# Patient Record
Sex: MALE | Employment: STUDENT | URBAN - METROPOLITAN AREA
[De-identification: names, ages, dates, MRNs, and addresses within clinical notes are randomized per-mention and may not be internally consistent; named-entity substitution may affect disease eponyms.]

---

## 2019-02-18 ENCOUNTER — TELEPHONE (OUTPATIENT)
Dept: INTERNAL MEDICINE | Facility: CLINIC | Age: 24
End: 2019-02-18

## 2019-02-18 NOTE — TELEPHONE ENCOUNTER
----- Message from Brian Upton sent at 2/15/2019  7:24 PM CST -----  Contact: Pt's Mother Ms.Carloyn Ray   Pt would like to be called back regarding needing to Est. Care with PCP. Looking to scheduling for a Physical appt. Mother would like to speak with nurse.    Pt can be reached at [phone].    Thank You.

## 2019-02-18 NOTE — TELEPHONE ENCOUNTER
----- Message from Brian Upton sent at 2/15/2019  7:24 PM CST -----  Contact: Pt's Mother Ms.Carloyn Ray   Pt would like to be called back regarding needing to Est. Care with PCP. Looking to scheduling for a Physical appt. Mother would like to speak with nurse.    Pt can be reached at [phone].    Thank You.

## 2019-02-22 ENCOUNTER — INITIAL CONSULT (OUTPATIENT)
Dept: OPTOMETRY | Facility: CLINIC | Age: 24
End: 2019-02-22
Payer: COMMERCIAL

## 2019-02-22 ENCOUNTER — OFFICE VISIT (OUTPATIENT)
Dept: INTERNAL MEDICINE | Facility: CLINIC | Age: 24
End: 2019-02-22
Payer: COMMERCIAL

## 2019-02-22 VITALS
HEIGHT: 70 IN | WEIGHT: 202.19 LBS | BODY MASS INDEX: 28.95 KG/M2 | HEART RATE: 62 BPM | TEMPERATURE: 98 F | OXYGEN SATURATION: 97 % | DIASTOLIC BLOOD PRESSURE: 70 MMHG | SYSTOLIC BLOOD PRESSURE: 120 MMHG

## 2019-02-22 DIAGNOSIS — H57.89 EYE REDNESS: Primary | ICD-10-CM

## 2019-02-22 DIAGNOSIS — H57.11 EYE PAIN, RIGHT: Primary | ICD-10-CM

## 2019-02-22 DIAGNOSIS — H53.141 PHOTOPHOBIA OF RIGHT EYE: ICD-10-CM

## 2019-02-22 PROCEDURE — 92002 INTRM OPH EXAM NEW PATIENT: CPT | Mod: S$GLB,,, | Performed by: OPTOMETRIST

## 2019-02-22 PROCEDURE — 99999 PR PBB SHADOW E&M-EST. PATIENT-LVL III: CPT | Mod: PBBFAC,,, | Performed by: OPTOMETRIST

## 2019-02-22 PROCEDURE — 92002 PR EYE EXAM, NEW PATIENT,INTERMED: ICD-10-PCS | Mod: S$GLB,,, | Performed by: OPTOMETRIST

## 2019-02-22 PROCEDURE — 99999 PR PBB SHADOW E&M-EST. PATIENT-LVL III: ICD-10-PCS | Mod: PBBFAC,,, | Performed by: OPTOMETRIST

## 2019-02-22 PROCEDURE — 99999 PR PBB SHADOW E&M-EST. PATIENT-LVL III: CPT | Mod: PBBFAC,,, | Performed by: NURSE PRACTITIONER

## 2019-02-22 PROCEDURE — 99499 NO LOS: ICD-10-PCS | Mod: S$GLB,,, | Performed by: NURSE PRACTITIONER

## 2019-02-22 PROCEDURE — 99999 PR PBB SHADOW E&M-EST. PATIENT-LVL III: ICD-10-PCS | Mod: PBBFAC,,, | Performed by: NURSE PRACTITIONER

## 2019-02-22 PROCEDURE — 99499 UNLISTED E&M SERVICE: CPT | Mod: S$GLB,,, | Performed by: NURSE PRACTITIONER

## 2019-02-22 NOTE — PROGRESS NOTES
"INTERNAL MEDICINE URGENT CARE NOTE    CHIEF COMPLAINT     Chief Complaint   Patient presents with    Conjunctivitis     R eye, possibl related to contacts, began this AM     Belepharitis       HPI     Pradeep Ray is a 24 y.o. male contact wearer who presents for an urgent visit today.  Here with c/o eye irritation to right eye - swollen shut this morning upon waking. Swelling improved but still with redness   +photophobia and burning.  Treated with clear eye drops.   Wears contacts- 2 month disposable, rotates as directed. Denies wearing over night.   Denies blurry vision.     Past Medical History:  No past medical history on file.    Home Medications:  Prior to Admission medications    Not on File       Review of Systems:  Review of Systems   Constitutional: Negative for chills, fatigue and fever.   HENT: Negative for congestion, postnasal drip, rhinorrhea, sinus pressure, sinus pain, sneezing and sore throat.    Eyes: Positive for photophobia, pain, discharge (watery no pus) and redness. Negative for itching and visual disturbance.   Skin: Negative for rash.       Health Maintainence:   Immunizations:  Health Maintenance    Patient has no pending health maintenance at this time          PHYSICAL EXAM     /70 (BP Location: Left arm, Patient Position: Sitting, BP Method: Large (Manual))   Pulse 62   Temp 97.9 °F (36.6 °C) (Oral)   Ht 5' 10" (1.778 m)   Wt 91.7 kg (202 lb 2.6 oz)   SpO2 97%   BMI 29.01 kg/m²     Physical Exam   Eyes: EOM are normal. Pupils are equal, round, and reactive to light. Right eye exhibits discharge. Left eye exhibits no discharge. Right conjunctiva is injected. Left conjunctiva is not injected. Left conjunctiva has no hemorrhage.       LABS     No results found for: LABA1C, HGBA1C  CMP  No results found for: NA, K, CL, CO2, GLU, BUN, CREATININE, CALCIUM, PROT, ALBUMIN, BILITOT, ALKPHOS, AST, ALT, ANIONGAP, ESTGFRAFRICA, EGFRNONAA  No results found for: WBC, HGB, HCT, MCV, " PLT  No results found for: CHOL  No results found for: HDL  No results found for: LDLCALC  No results found for: TRIG  No results found for: CHOLHDL  No results found for: TSH, E2CUCIK, H9LYWVF, THYROIDAB    ASSESSMENT/PLAN     Pradeep Ray is a 24 y.o. male with  No past medical history on file.    Eye redness- spoke with Dr Espinoza, she will see pt this afternoon. To r/o corneal abrasion.     Photophobia of right eye    Follow up with PCP as scheduled.     Patient education provided from [facility]. Patient was counseled on when and how to seek emergent care.       Rosalinda NASCIMENTO, APRN, FNP-c   Department of Internal Medicine - [facility]  11:48 AM

## 2019-02-22 NOTE — PROGRESS NOTES
HPI     Mr. Pradeep Ray is here for an urgent visit.     Mild irritation OD when he removed contact lenses last night. When he   awoke this morning he had swelling, burning, light sensitivity, and   tearing. Worsened throughout the day, so he saw Rosalinda Spencer NP   who referred him for an eye exam. Symptoms are now improving.     Contact Lens History:  Brand: unknown  Avg Replacement: 2 months (pt says they are 2-month replacement and he is   very compliant with replacement schedule)  Age of current lenses: 6 weeks  Overnight wear? no  Avg wear time: about 15 hours, 7 days per week  Solution: [facility] brand            Last edited by Ramandeep Hines, OD on 2/22/2019  3:26 PM. (History)            Assessment /Plan     For exam results, see Encounter Report.    Eye pain, right   Unknown etiology (possibly contact lens related), but symptoms improving.   Recommended no contact lenses until symptoms fully resolve. And artificial tears TID-QID.   RTC if symptoms worsen or have not improved within a few days.      RTC prn

## 2019-02-22 NOTE — PATIENT INSTRUCTIONS
If you have any problems after hours or over the weekend, please call the [facility]  at [phone] and ask that the on-call Ophthalmologist be paged.

## 2019-02-22 NOTE — LETTER
February 22, 2019      Rosalinda Spencer, NP  [address]  [facility] [id number]           [facility]  [address]  [facility] [address]  Phone: [phone]          Patient: Pradeep Ray   MR Number: [id number]   YOB: 1995   Date of Visit: 2/22/2019       Dear Rosalinda Spencer:    Thank you for referring Pradeep Ray to me for evaluation. Attached you will find relevant portions of my assessment and plan of care.    If you have questions, please do not hesitate to call me. I look forward to following Pradeep Ray along with you.    Sincerely,    Ramandeep Hines, OD    Enclosure  CC:  No Recipients    If you would like to receive this communication electronically, please contact [email or web address] or [phone] to request more information on [employer] access.    For providers and/or their staff who would like to refer a patient to [facility], please contact us through our one-stop-shop provider referral line, [facility], at [phone].    If you feel you have received this communication in error or would no longer like to receive these types of communications, please e-mail [email or web address]

## 2019-02-22 NOTE — LETTER
February 22, 2019      [facility] Internal Medicine  [address]  [facility] [address]  Phone: [phone]  Fax: [phone]       Patient: Pradeep Ray   YOB: 1995  Date of Visit: 02/22/2019    To Whom It May Concern:    Joycelyn Ray  was at [facility] on 02/22/2019. He may return to work on Monday February 25, 2019 with no restrictions. If you have any questions or concerns, or if I can be of further assistance, please do not hesitate to contact me.    Sincerely,    Rosalinda Spencer NP

## 2019-02-28 ENCOUNTER — OFFICE VISIT (OUTPATIENT)
Dept: INTERNAL MEDICINE | Facility: CLINIC | Age: 24
End: 2019-02-28
Payer: COMMERCIAL

## 2019-02-28 VITALS
BODY MASS INDEX: 29.16 KG/M2 | DIASTOLIC BLOOD PRESSURE: 72 MMHG | RESPIRATION RATE: 16 BRPM | HEIGHT: 70 IN | HEART RATE: 53 BPM | OXYGEN SATURATION: 96 % | SYSTOLIC BLOOD PRESSURE: 98 MMHG | TEMPERATURE: 99 F | WEIGHT: 203.69 LBS

## 2019-02-28 DIAGNOSIS — Z00.00 ANNUAL PHYSICAL EXAM: Primary | ICD-10-CM

## 2019-02-28 PROCEDURE — 99395 PR PREVENTIVE VISIT,EST,18-39: ICD-10-PCS | Mod: S$GLB,,, | Performed by: INTERNAL MEDICINE

## 2019-02-28 PROCEDURE — 99999 PR PBB SHADOW E&M-EST. PATIENT-LVL III: ICD-10-PCS | Mod: PBBFAC,,, | Performed by: INTERNAL MEDICINE

## 2019-02-28 PROCEDURE — 99395 PREV VISIT EST AGE 18-39: CPT | Mod: S$GLB,,, | Performed by: INTERNAL MEDICINE

## 2019-02-28 PROCEDURE — 99999 PR PBB SHADOW E&M-EST. PATIENT-LVL III: CPT | Mod: PBBFAC,,, | Performed by: INTERNAL MEDICINE

## 2019-02-28 NOTE — PROGRESS NOTES
Subjective:       Patient ID: Pradeep Ray is a 24 y.o. male.    Chief Complaint: Annual Exam    HPI     24 y.o. male here for annual exam.     Cholesterol: needs  Vaccines: Influenza - needs; Tetanus - not sure, but within 10 yrs.  Sexual Screening:  active  STD screening: no concern  Eye exam: done last week  Prostate: no family history of cancer  Colonoscopy: no family history of cancer    Exercise:  Goes to the gym 4-5 times a week.  Lifts weights and does cardio.  Diet:  Mostly home cooked.    Last week, he had an eye checkup after he noted it was swollen shut.  He had an eye stain and doing tests.  Told to stop contacts for a while and to stick with glasses.    History reviewed. No pertinent past medical history.  Past Surgical History:   Procedure Laterality Date    MOUTH RANULA EXCISION       Social History     Socioeconomic History    Marital status: Single     Spouse name: Not on file    Number of children: Not on file    Years of education: Not on file    Highest education level: Not on file   Social Needs    Financial resource strain: Not on file    Food insecurity - worry: Not on file    Food insecurity - inability: Not on file    Transportation needs - medical: Not on file    Transportation needs - non-medical: Not on file   Occupational History    Not on file   Tobacco Use    Smoking status: Never Smoker    Smokeless tobacco: Never Used   Substance and Sexual Activity    Alcohol use: Yes     Frequency: Never     Comment: once a month    Drug use: No    Sexual activity: Yes   Other Topics Concern    Not on file   Social History Narrative    Not on file     Review of patient's allergies indicates:   Allergen Reactions    Peanut Rash     Pradeep Ray had no medications administered during this visit.    Review of Systems   Constitutional: Negative for chills, fatigue and unexpected weight change.   HENT: Negative for congestion, postnasal drip and sore throat.    Eyes: Negative  for redness and visual disturbance.   Respiratory: Negative for cough and shortness of breath.    Cardiovascular: Negative for chest pain and palpitations.   Gastrointestinal: Negative for abdominal pain, constipation, diarrhea, nausea and vomiting.   Genitourinary: Negative for dysuria, frequency and hematuria.   Musculoskeletal: Negative for arthralgias and myalgias.   Skin: Negative for color change and rash.   Neurological: Negative for dizziness and headaches.       Objective:      Physical Exam   Constitutional: He is oriented to person, place, and time. He appears well-developed and well-nourished.   HENT:   Head: Normocephalic and atraumatic.   Mouth/Throat: No oropharyngeal exudate.   Eyes: EOM are normal. Pupils are equal, round, and reactive to light. Right eye exhibits no discharge. Left eye exhibits no discharge. No scleral icterus.   Neck: Normal range of motion. Neck supple. No tracheal deviation present. No thyromegaly present.   Cardiovascular: Normal rate, regular rhythm and normal heart sounds. Exam reveals no gallop and no friction rub.   No murmur heard.  Pulmonary/Chest: Effort normal and breath sounds normal. No respiratory distress. He has no wheezes. He has no rales. He exhibits no tenderness.   Abdominal: Soft. Bowel sounds are normal. He exhibits no distension and no mass. There is no tenderness. There is no rebound and no guarding.   Musculoskeletal: Normal range of motion. He exhibits no edema or tenderness.   Neurological: He is alert and oriented to person, place, and time.   Skin: Skin is warm and dry. No rash noted. No erythema. No pallor.   Psychiatric: He has a normal mood and affect. His behavior is normal.   Vitals reviewed.      Assessment:       1. Annual physical exam        Plan:       1.  Check CBC, CMP, TSH, lipids.  Flu vaccine given today.  Up-to-date on tetanus vaccine.  Discussed exercise with patient.  No need for colon cancer or prostate cancer screening.  Return to  clinic in 1 year or sooner if needed.